# Patient Record
Sex: MALE
[De-identification: names, ages, dates, MRNs, and addresses within clinical notes are randomized per-mention and may not be internally consistent; named-entity substitution may affect disease eponyms.]

---

## 2018-05-29 ENCOUNTER — HOSPITAL ENCOUNTER (EMERGENCY)
Dept: HOSPITAL 14 - H.ER | Age: 43
Discharge: HOME | End: 2018-05-29
Payer: COMMERCIAL

## 2018-05-29 VITALS — BODY MASS INDEX: 27.3 KG/M2

## 2018-05-29 VITALS
OXYGEN SATURATION: 99 % | HEART RATE: 88 BPM | TEMPERATURE: 98 F | DIASTOLIC BLOOD PRESSURE: 77 MMHG | SYSTOLIC BLOOD PRESSURE: 128 MMHG

## 2018-05-29 VITALS — RESPIRATION RATE: 18 BRPM
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
## 2018-05-29 DIAGNOSIS — S99.921A: ICD-10-CM

## 2018-05-29 DIAGNOSIS — S99.911A: Primary | ICD-10-CM

## 2018-05-29 DIAGNOSIS — F17.210: ICD-10-CM

## 2018-05-29 NOTE — ED PDOC
Lower Extremity Pain/Injury


Time Seen by Provider: 05/29/18 12:03


Chief Complaint (Nursing): Lower Extremity Problem/Injury


Chief Complaint (Provider): Right Foot Injury 


History Per: Patient


History/Exam Limitations: no limitations


Onset/Duration Of Symptoms: Days (05/29/18)


Current Symptoms Are (Timing): Still Present


Additional Complaint(s): 


42 year old male presents to the ED stating he fell going down the stairs and 

twisted his right ankle and right foot. Reports of minimal swelling and pain 

initially but it became worse this morning. Denies numbness, tingling, or any 

other injuries. 





PMD: No Family Provider 








- Ankle/Foot


Description Of Injury: Fell, Twisted





Past Medical History


Reviewed: Historical Data, Nursing Documentation, Vital Signs


Vital Signs: 





 Last Vital Signs











Temp  98 F   05/29/18 11:27


 


Pulse  88   05/29/18 11:27


 


Resp  18   05/29/18 11:27


 


BP  128/77   05/29/18 11:27


 


Pulse Ox  99   05/29/18 11:27














- Medical History


PMH: No Chronic Diseases





- Surgical History


Other surgeries: Left middle finger injury





- Family History


Family History: States: Unknown Family Hx





- Social History


Current smoker - smoking cessation education provided: Yes (Light Smoker < 10 

Cigarettes Daily)


Alcohol: Social


Drugs: Denies





- Allergies


Allergies/Adverse Reactions: 


 Allergies











Allergy/AdvReac Type Severity Reaction Status Date / Time


 


No Known Allergies Allergy   Verified 05/29/18 11:27














Review of Systems


ROS Statement: Except As Marked, All Systems Reviewed And Found Negative


Musculoskeletal: Positive for: Foot Pain (right ).  Negative for: Other (

tingling)


Neurological: Negative for: Numbness





Physical Exam





- Reviewed


Nursing Documentation Reviewed: Yes


Vital Signs Reviewed: Yes





- Physical Exam


Appears: Positive for: Non-toxic, No Acute Distress


Head Exam: Positive for: ATRAUMATIC, NORMAL INSPECTION, NORMOCEPHALIC


Skin: Positive for: Normal Color, Warm, Dry


Pulses-Dorsalis Pedis (L): 2+


Pulses-Dorsalis Pedis (R): 2+


Extremity: Positive for: Tenderness (minimal to the right foot and right 

lateral malleolus), Capillary Refill (less than 2 seconds), Swelling (moderate)

, Other (ecchymosis).  Negative for: Deformity


Neurologic/Psych: Positive for: Alert, Oriented (x3).  Negative for: Motor/

Sensory Deficits





- ECG


O2 Sat by Pulse Oximetry: 99 (RA)


Pulse Ox Interpretation: Normal





Medical Decision Making


Medical Decision Making: 


Time: 1241


Initial Plan:


--Naproxen 500mg


--Ankle Right 3 Views


--Foot Right 3 Views


--Reevaluation 





Time: 1316


PROCEDURE:  Right Ankle Radiographs.





HISTORY:


Trauma





COMPARISON:


None





FINDINGS:





BONES:


Bone alignment and mineralization are normal.  There is no acute displaced 

fracture or bone destruction.





JOINTS:


Normal. Ankle mortise maintained. Talar dome intact





SOFT TISSUES:


Normal. 





OTHER FINDINGS:


None.





IMPRESSION:


No acute fracture or dislocation.





Time:1318


PROCEDURE:  Right Foot Radiographs.





HISTORY:


trauma  





COMPARISON:


None.





FINDINGS:





BONES:


Bone alignment and mineralization are normal.  There is no acute displaced 

fracture or bone destruction.





JOINTS:


Normal. 





SOFT TISSUES:


Normal. 





OTHER FINDINGS:


None.





IMPRESSION:


No acute fracture or dislocation.





Clinical Impression: Ankle sprain, Foot sprain





Ankle and foot immobilized in aircast splint applied by ED tech (Kenny). 

Crutches and crutch walking instructions given. 





Upon provider evaluation patient is medically stable, and requires no further 

treatment in the ED at this time. Patient will be discharged. Counseling was 

provided and all questions were answered regarding diagnosis and need for 

follow up with Podiatry clinic. There is agreement to discharge plan. Return if 

symptoms persist or worsen.


--------------------------------------------------------------------------------

-----------------


Scribe Attestation:   


Documented by Reyna Hernandez, acting as a scribe for Cresencio Yoon PA-C





Provider Scribe Attestation:


All medical record entries made by the Scribe were at my direction and 

personally dictated by me. I have reviewed the chart and agree that the record 

accurately reflects my personal performance of the history, physical exam, 

medical decision making, and the department course for this patient. I have 

also personally directed, reviewed, and agree with the discharge instructions 

and disposition.








Disposition





- Clinical Impression


Clinical Impression: 


 Ankle sprain, Foot sprain








- Patient ED Disposition


Is Patient to be Admitted: No





- Disposition


Referrals: 


Podiatry Clinic [Outside]


Disposition: Routine/Home


Disposition Time: 14:50


Condition: STABLE


Additional Instructions: 


Follow up with podiatry clinic for further evaluation


Return to ED immediately if symptoms worsen


Instructions:  Ankle Sprain (DC), How to Use Crutches, Going Up and Down Curbs 

or Stairs With a Walker or Crutches, Foot Sprain (DC)


Forms:  [facility] (English)


Print Language: ENGLISH

## 2018-05-29 NOTE — RAD
PROCEDURE:  Right Foot Radiographs.



HISTORY:

trauma  



COMPARISON:

None.



FINDINGS:



BONES:

Bone alignment and mineralization are normal.  There is no acute 

displaced fracture or bone destruction.



JOINTS:

Normal. 



SOFT TISSUES:

Normal. 



OTHER FINDINGS:

None.



IMPRESSION:

No acute fracture or dislocation.

## 2018-05-29 NOTE — RAD
PROCEDURE:  Right Ankle Radiographs.



HISTORY:

Trauma



COMPARISON:

None



FINDINGS:



BONES:

Bone alignment and mineralization are normal.  There is no acute 

displaced fracture or bone destruction.



JOINTS:

Normal. Ankle mortise maintained. Talar dome intact



SOFT TISSUES:

Normal. 



OTHER FINDINGS:

None.



IMPRESSION:

No acute fracture or dislocation.